# Patient Record
Sex: FEMALE | Race: OTHER | Employment: UNEMPLOYED | URBAN - METROPOLITAN AREA
[De-identification: names, ages, dates, MRNs, and addresses within clinical notes are randomized per-mention and may not be internally consistent; named-entity substitution may affect disease eponyms.]

---

## 2019-10-04 ENCOUNTER — OFFICE VISIT (OUTPATIENT)
Dept: FAMILY MEDICINE CLINIC | Facility: CLINIC | Age: 74
End: 2019-10-04
Payer: MEDICARE

## 2019-10-04 VITALS
OXYGEN SATURATION: 98 % | WEIGHT: 162 LBS | BODY MASS INDEX: 31.8 KG/M2 | HEART RATE: 83 BPM | DIASTOLIC BLOOD PRESSURE: 80 MMHG | TEMPERATURE: 98 F | SYSTOLIC BLOOD PRESSURE: 124 MMHG | HEIGHT: 60 IN

## 2019-10-04 DIAGNOSIS — R31.9 URINARY TRACT INFECTION WITH HEMATURIA, SITE UNSPECIFIED: Primary | ICD-10-CM

## 2019-10-04 DIAGNOSIS — N39.0 URINARY TRACT INFECTION WITH HEMATURIA, SITE UNSPECIFIED: Primary | ICD-10-CM

## 2019-10-04 PROCEDURE — 87077 CULTURE AEROBIC IDENTIFY: CPT | Performed by: FAMILY MEDICINE

## 2019-10-04 PROCEDURE — 99213 OFFICE O/P EST LOW 20 MIN: CPT | Performed by: FAMILY MEDICINE

## 2019-10-04 PROCEDURE — 87086 URINE CULTURE/COLONY COUNT: CPT | Performed by: FAMILY MEDICINE

## 2019-10-04 PROCEDURE — 87186 SC STD MICRODIL/AGAR DIL: CPT | Performed by: FAMILY MEDICINE

## 2019-10-04 PROCEDURE — 81003 URINALYSIS AUTO W/O SCOPE: CPT | Performed by: FAMILY MEDICINE

## 2019-10-04 RX ORDER — NITROFURANTOIN 25; 75 MG/1; MG/1
100 CAPSULE ORAL 2 TIMES DAILY
Qty: 10 CAPSULE | Refills: 3 | Status: SHIPPED | OUTPATIENT
Start: 2019-10-04 | End: 2019-10-09

## 2019-10-04 NOTE — PATIENT INSTRUCTIONS
Drink 2 liters of water daily  Cranberry 100% juice  Come in back 1-2 months for blood  If you get recurrent symptoms, start medicine and take for 3 days

## 2019-10-06 ENCOUNTER — TELEPHONE (OUTPATIENT)
Dept: FAMILY MEDICINE CLINIC | Facility: CLINIC | Age: 74
End: 2019-10-06

## 2019-10-06 RX ORDER — SULFAMETHOXAZOLE AND TRIMETHOPRIM 800; 160 MG/1; MG/1
1 TABLET ORAL 2 TIMES DAILY
Qty: 14 TABLET | Refills: 1 | Status: SHIPPED | OUTPATIENT
Start: 2019-10-06 | End: 2019-10-13

## 2019-10-08 ENCOUNTER — TELEPHONE (OUTPATIENT)
Dept: FAMILY MEDICINE CLINIC | Facility: CLINIC | Age: 74
End: 2019-10-08

## 2019-10-08 NOTE — TELEPHONE ENCOUNTER
Dr. Rivers Degree called stating pt reached out to her that she didn't get the RX that was sent over for pt on 10/6/2019:  Sulfamethoxazole-TMP DS (BACTRIM DS) 800-160 MG Oral Tab per tablet 14 tablet 1 10/6/2019 10/13/2019    Sig - Route:  Take 1 tablet by mouth 2

## 2019-12-02 ENCOUNTER — LAB ENCOUNTER (OUTPATIENT)
Dept: LAB | Facility: REFERENCE LAB | Age: 74
End: 2019-12-02
Attending: FAMILY MEDICINE
Payer: MEDICARE

## 2019-12-02 ENCOUNTER — OFFICE VISIT (OUTPATIENT)
Dept: FAMILY MEDICINE CLINIC | Facility: CLINIC | Age: 74
End: 2019-12-02
Payer: MEDICARE

## 2019-12-02 VITALS
RESPIRATION RATE: 16 BRPM | SYSTOLIC BLOOD PRESSURE: 124 MMHG | TEMPERATURE: 99 F | BODY MASS INDEX: 30.16 KG/M2 | WEIGHT: 149.63 LBS | HEIGHT: 59 IN | DIASTOLIC BLOOD PRESSURE: 62 MMHG | OXYGEN SATURATION: 98 % | HEART RATE: 78 BPM

## 2019-12-02 DIAGNOSIS — D50.9 IRON DEFICIENCY ANEMIA, UNSPECIFIED IRON DEFICIENCY ANEMIA TYPE: ICD-10-CM

## 2019-12-02 DIAGNOSIS — N30.00 ACUTE CYSTITIS WITHOUT HEMATURIA: ICD-10-CM

## 2019-12-02 DIAGNOSIS — R63.4 WEIGHT LOSS: Primary | ICD-10-CM

## 2019-12-02 DIAGNOSIS — R63.4 WEIGHT LOSS: ICD-10-CM

## 2019-12-02 PROCEDURE — 87086 URINE CULTURE/COLONY COUNT: CPT | Performed by: FAMILY MEDICINE

## 2019-12-02 PROCEDURE — 84439 ASSAY OF FREE THYROXINE: CPT

## 2019-12-02 PROCEDURE — 99214 OFFICE O/P EST MOD 30 MIN: CPT | Performed by: FAMILY MEDICINE

## 2019-12-02 PROCEDURE — 36415 COLL VENOUS BLD VENIPUNCTURE: CPT

## 2019-12-02 PROCEDURE — 85025 COMPLETE CBC W/AUTO DIFF WBC: CPT

## 2019-12-02 PROCEDURE — 84443 ASSAY THYROID STIM HORMONE: CPT

## 2019-12-02 PROCEDURE — 80053 COMPREHEN METABOLIC PANEL: CPT

## 2019-12-02 NOTE — PROGRESS NOTES
HPI:    Patient ID: Constantin Flores is a 69year old female who presents for year for follow-up of UTI. She had positive urine culture approximately 1 month ago, sensitive to Bactrim, 7-day regimen sent in to pharmacy. .  She has lost 12 12 pounds in 2 month Topics      Concerns:        Caffeine Concern: Not Asked        Exercise: Not Asked        Seat Belt: Not Asked        Special Diet: Not Asked        Stress Concern: Not Asked        Weight Concern: Not Asked    Social History Narrative      EMERGENCY CONT negative  Skin significant for approximately 15 x 10 cm scar mid sternum. ASSESSMENT/PLAN:   Weight loss  (primary encounter diagnosis)  Iron deficiency anemia, unspecified iron deficiency anemia type  Acute cystitis without hematuria    1.  Weight lo

## 2019-12-04 ENCOUNTER — TELEPHONE (OUTPATIENT)
Dept: FAMILY MEDICINE CLINIC | Facility: CLINIC | Age: 74
End: 2019-12-04

## 2020-06-25 ENCOUNTER — TELEPHONE (OUTPATIENT)
Dept: FAMILY MEDICINE CLINIC | Facility: CLINIC | Age: 75
End: 2020-06-25

## 2020-06-25 DIAGNOSIS — R35.0 FREQUENT URINATION: Primary | ICD-10-CM

## 2020-06-25 NOTE — TELEPHONE ENCOUNTER
Order placed as requested. And pt added to BEVERLY's schedule tomorrow. LMTCB to discuss [address] instructions    Isabelle Aldana, DO  You 19 minutes ago (11:29 AM)      Please order Ua/micro with refelx to culture.      Thanks,   beverly Nettles 10 Dr. Yuliet Carrasco

## 2020-06-25 NOTE — TELEPHONE ENCOUNTER
Patient has appointment on 7/6 and would like to get in sooner. Unable to find anything sooner. Patient complaining of frequency of urination.

## 2020-06-25 NOTE — TELEPHONE ENCOUNTER
Pt will come in tomorrow to see [facility] and have UA and C&S done then. Pt states she would like lab work as well which pt will discuss with [facility] tomorrow.

## 2020-06-25 NOTE — TELEPHONE ENCOUNTER
Frequent urination x 7 days. Pt denies: hematuria, abd/back pain, dysuria, burning, fever, chills. Informed pt that this RN does not see any openings either sooner but will discuss with [address] and call pt back. Pt verbalized understanding and agrees with POC.

## 2020-06-26 ENCOUNTER — APPOINTMENT (OUTPATIENT)
Dept: LAB | Facility: REFERENCE LAB | Age: 75
End: 2020-06-26
Attending: FAMILY MEDICINE
Payer: MEDICARE

## 2020-06-26 ENCOUNTER — OFFICE VISIT (OUTPATIENT)
Dept: FAMILY MEDICINE CLINIC | Facility: CLINIC | Age: 75
End: 2020-06-26
Payer: MEDICARE

## 2020-06-26 VITALS
DIASTOLIC BLOOD PRESSURE: 78 MMHG | HEIGHT: 59 IN | SYSTOLIC BLOOD PRESSURE: 124 MMHG | OXYGEN SATURATION: 99 % | BODY MASS INDEX: 28.22 KG/M2 | WEIGHT: 140 LBS | TEMPERATURE: 98 F | HEART RATE: 74 BPM

## 2020-06-26 DIAGNOSIS — D50.9 IRON DEFICIENCY ANEMIA, UNSPECIFIED IRON DEFICIENCY ANEMIA TYPE: ICD-10-CM

## 2020-06-26 DIAGNOSIS — R63.4 WEIGHT LOSS, NON-INTENTIONAL: Primary | ICD-10-CM

## 2020-06-26 LAB
BILIRUB UR QL: NEGATIVE
COLOR UR: YELLOW
GLUCOSE UR-MCNC: NEGATIVE MG/DL
HGB UR QL STRIP.AUTO: NEGATIVE
KETONES UR-MCNC: NEGATIVE MG/DL
LEUKOCYTE ESTERASE UR QL STRIP.AUTO: NEGATIVE
NITRITE UR QL STRIP.AUTO: NEGATIVE
PH UR: 8 [PH] (ref 5–8)
PROT UR-MCNC: NEGATIVE MG/DL
SP GR UR STRIP: 1.01 (ref 1–1.03)
UROBILINOGEN UR STRIP-ACNC: <2

## 2020-06-26 PROCEDURE — 81003 URINALYSIS AUTO W/O SCOPE: CPT | Performed by: FAMILY MEDICINE

## 2020-06-26 PROCEDURE — 99214 OFFICE O/P EST MOD 30 MIN: CPT | Performed by: FAMILY MEDICINE

## 2020-06-26 RX ORDER — OXYBUTYNIN CHLORIDE 5 MG/1
5 TABLET ORAL NIGHTLY
Qty: 30 TABLET | Refills: 1 | Status: SHIPPED | OUTPATIENT
Start: 2020-06-26

## 2020-06-26 NOTE — PROGRESS NOTES
Had ultrasound it was [facility].  Patient had chest x-ray via her cardiologist Dr. Ashleigh Todd at [facility]. HPI:    Patient ID: Landon Steele is a 77year old female who presents for polyuria. She urinates 5 times in an hour.   She is up every 2 hour pounds.   Neck supple  Heart regular rate and rhythm  Lungs clear to auscultation  Abdomen soft without visceromegaly masses and tenderness  Skin without rash       ASSESSMENT/PLAN:   Weight loss, non-intentional  (primary encounter diagnosis)  Iron deficie

## 2020-06-29 ENCOUNTER — APPOINTMENT (OUTPATIENT)
Dept: LAB | Facility: REFERENCE LAB | Age: 75
End: 2020-06-29
Attending: FAMILY MEDICINE
Payer: MEDICARE

## 2020-06-29 ENCOUNTER — TELEPHONE (OUTPATIENT)
Dept: FAMILY MEDICINE CLINIC | Facility: CLINIC | Age: 75
End: 2020-06-29

## 2020-06-29 DIAGNOSIS — R63.4 WEIGHT LOSS, NON-INTENTIONAL: ICD-10-CM

## 2020-06-29 DIAGNOSIS — D50.9 IRON DEFICIENCY ANEMIA, UNSPECIFIED IRON DEFICIENCY ANEMIA TYPE: ICD-10-CM

## 2020-06-29 LAB
IRON SATURATION: 21 % (ref 15–50)
IRON SERPL-MCNC: 79 UG/DL (ref 50–170)
TOTAL IRON BINDING CAPACITY: 384 UG/DL (ref 240–450)
TRANSFERRIN SERPL-MCNC: 258 MG/DL (ref 200–360)

## 2020-06-29 PROCEDURE — 87209 SMEAR COMPLEX STAIN: CPT

## 2020-06-29 PROCEDURE — 87329 GIARDIA AG IA: CPT

## 2020-06-29 PROCEDURE — 84466 ASSAY OF TRANSFERRIN: CPT

## 2020-06-29 PROCEDURE — 36415 COLL VENOUS BLD VENIPUNCTURE: CPT

## 2020-06-29 PROCEDURE — 87177 OVA AND PARASITES SMEARS: CPT

## 2020-06-29 PROCEDURE — 83540 ASSAY OF IRON: CPT

## 2020-06-29 PROCEDURE — 87272 CRYPTOSPORIDIUM AG IF: CPT

## 2020-06-29 NOTE — TELEPHONE ENCOUNTER
Informed pt that msg pt returned was from last week. Pt verbalized understanding and agrees with POC. Me      6/25/20 12:06 PM   Note      Order placed as requested. And pt added to MK's schedule tomorrow.  LMTCB to discuss [address] instructions     Katharine Gil

## 2020-06-29 NOTE — TELEPHONE ENCOUNTER
Patient returning a call from [place]. Patient received a call today saying Dr. Bibi Bruner has further instructions.

## 2020-06-30 ENCOUNTER — TELEPHONE (OUTPATIENT)
Dept: FAMILY MEDICINE CLINIC | Facility: CLINIC | Age: 75
End: 2020-06-30

## 2020-06-30 LAB
CRYPTOSP AG STL QL IA: NEGATIVE
G LAMBLIA AG STL QL IA: NEGATIVE

## 2020-06-30 NOTE — TELEPHONE ENCOUNTER
LIZ FROM THE [facility] STATED SHE SPOKE WITH PT AND REQUESTED ANOTHER SAMPLE AND PT REFUSED.

## 2020-06-30 NOTE — TELEPHONE ENCOUNTER
Left message on answering machine  Urinalysis normal  Iron studies normal  Stool studies pending. Patient still needs EKG.

## 2020-07-02 ENCOUNTER — TELEPHONE (OUTPATIENT)
Dept: FAMILY MEDICINE CLINIC | Facility: CLINIC | Age: 75
End: 2020-07-02

## 2020-07-02 NOTE — TELEPHONE ENCOUNTER
Dr. Romulo Boss it looks like the patient does not want to do the Ova and parasite test again.  Just an Karli Chi

## 2020-12-09 ENCOUNTER — TELEPHONE (OUTPATIENT)
Dept: FAMILY MEDICINE CLINIC | Facility: CLINIC | Age: 75
End: 2020-12-09

## 2020-12-09 NOTE — TELEPHONE ENCOUNTER
Johnson Tinajero, DO  Adelia Lennox, RN 21 minutes ago (2:44 PM)     To ER. Message text      Called pt and daughter /70, took Pepto scared of diarrhea (does not have), denies nausea (vomitted earlier today but currently states she is doing well), dizziness gone, BP IN THE /86). Temp 97.7 currently oral. Advised to go to ED per Dr Avinash Oneill instructions but declined states that feeling better. Informed daughter to monitor BP/log numbers for Dr. Daysi Martinez (has visit coming-up) and monitor temp anything 100.4 or above is a fever. Informed pt again of ED visit and states she feels better, declines to go. Informed daughter if occurrs again, pls have pt go to the ED. Informed that will relay message to Dr. Daysi Martinez.

## 2020-12-09 NOTE — TELEPHONE ENCOUNTER
RN returning pt's call. Pt's daughter had this RN on speaker, pt's daughter states she was showering. RN discouraged showering since pt had reported dizziness. Pt's daughter stated she was sitting down in a shower seat. Pt states that she vomited a few times this morning and has been experiencing dizziness all morning, denies losing consciousness. Pt's daughter also reported that Pt's blood pressure was \"155/80-something\". RN instructed pt and pt's daughter that pt needs to be seen in the IC. Pt and pt's daughter verbalized understanding, agrees to POC.

## 2020-12-11 ENCOUNTER — VIRTUAL PHONE E/M (OUTPATIENT)
Dept: FAMILY MEDICINE CLINIC | Facility: CLINIC | Age: 75
End: 2020-12-11
Payer: MEDICARE

## 2020-12-11 DIAGNOSIS — R42 DIZZINESS: ICD-10-CM

## 2020-12-11 DIAGNOSIS — R11.11 NON-INTRACTABLE VOMITING WITHOUT NAUSEA, UNSPECIFIED VOMITING TYPE: Primary | ICD-10-CM

## 2020-12-11 PROCEDURE — 99442 PHONE E/M BY PHYS 11-20 MIN: CPT | Performed by: FAMILY MEDICINE

## 2020-12-11 RX ORDER — MELOXICAM 7.5 MG/1
TABLET ORAL
COMMUNITY

## 2020-12-11 NOTE — PROGRESS NOTES
Virtual Telephone Check-In    Jesi Santana verbally consents to a Virtual/Telephone Check-In visit on 12/11/20. Patient has been referred to the [facility] website at [email or web address] to review the yearly Consent to Treat document.     Patient Dao Lu

## 2021-01-15 DIAGNOSIS — R93.41 ABNORMAL CT SCAN, BLADDER: Primary | ICD-10-CM

## 2021-01-15 DIAGNOSIS — N81.10 FEMALE BLADDER PROLAPSE: ICD-10-CM

## 2021-01-27 NOTE — PROGRESS NOTES
Pt had abdominal pain lower for one week. She had no dysuria dysuria, urgency, or frequency. She has had no fevers or back pain.   Physical exam  Patient is alert and oriented x4 no acute distress  Heart regular rate and rhythm without murmur  Lungs clear 3 or 4

## 2021-03-12 DIAGNOSIS — Z23 NEED FOR VACCINATION: ICD-10-CM

## 2021-04-19 ENCOUNTER — TELEPHONE (OUTPATIENT)
Dept: FAMILY MEDICINE CLINIC | Facility: CLINIC | Age: 76
End: 2021-04-19

## 2021-04-19 NOTE — TELEPHONE ENCOUNTER
Spoke to patient over phone regarding external labs that were completed on 4/12/21. CBC with anemia (hgb 10, MCV 80), and iron studies indicating MARIA DE JESUS. Chronic hx. Pt denies any current bleeding or hematochezia/melena. No lightheadedness/dizziness or SOB.  H

## 2021-04-21 ENCOUNTER — MED REC SCAN ONLY (OUTPATIENT)
Dept: FAMILY MEDICINE CLINIC | Facility: CLINIC | Age: 76
End: 2021-04-21

## 2021-07-28 ENCOUNTER — TELEPHONE (OUTPATIENT)
Dept: FAMILY MEDICINE CLINIC | Facility: CLINIC | Age: 76
End: 2021-07-28

## 2021-07-28 NOTE — TELEPHONE ENCOUNTER
Left a voicemail informing Dr. Rere Kapadia had retired. And was trying to check if they wanted to continue care with one of our other Family Practice providers either Dr. Naeem Boyd or Dr. Nubia Gagnon and asked them to call back at [phone].

## 2021-09-13 ENCOUNTER — TELEPHONE (OUTPATIENT)
Dept: SCHEDULING | Age: 76
End: 2021-09-13

## 2022-05-10 ENCOUNTER — TELEPHONE (OUTPATIENT)
Dept: FAMILY MEDICINE CLINIC | Facility: CLINIC | Age: 77
End: 2022-05-10

## 2023-02-14 ENCOUNTER — TELEPHONE (OUTPATIENT)
Facility: CLINIC | Age: 78
End: 2023-02-14

## 2024-05-16 ENCOUNTER — TELEPHONE (OUTPATIENT)
Facility: CLINIC | Age: 79
End: 2024-05-16

## 2024-05-16 NOTE — TELEPHONE ENCOUNTER
1st Attempt: 5/16/2024    Unable to Leave Voice Mail To Call Back    Outreach patient to help schedule Medicare Annual Wellness Visit.      Last visit: Has not been seen

## 2024-06-04 ENCOUNTER — TELEPHONE (OUTPATIENT)
Facility: CLINIC | Age: 79
End: 2024-06-04

## 2024-06-04 NOTE — TELEPHONE ENCOUNTER
2nd Attempt: 6/4/2024     Left voice to call back to schedule     Outreach patient to help schedule Medicare Physical      Last visit: 6/3/2024

## 2024-07-30 ENCOUNTER — TELEPHONE (OUTPATIENT)
Facility: CLINIC | Age: 79
End: 2024-07-30

## 2024-07-30 NOTE — TELEPHONE ENCOUNTER
3rd Attempt: 7/30/2024    Unable to Leave Voice Mail To Call Back    Outreach patient to help schedule Medicare Annual Wellness Visit.      Last visit: None

## 2025-01-27 ENCOUNTER — TELEPHONE (OUTPATIENT)
Dept: OBGYN CLINIC | Facility: CLINIC | Age: 80
End: 2025-01-27

## 2025-03-14 ENCOUNTER — TELEPHONE (OUTPATIENT)
Facility: CLINIC | Age: 80
End: 2025-03-14